# Patient Record
Sex: MALE | Race: WHITE | NOT HISPANIC OR LATINO | Employment: UNEMPLOYED | URBAN - NONMETROPOLITAN AREA
[De-identification: names, ages, dates, MRNs, and addresses within clinical notes are randomized per-mention and may not be internally consistent; named-entity substitution may affect disease eponyms.]

---

## 2017-01-12 ENCOUNTER — OFFICE VISIT - GICH (OUTPATIENT)
Dept: FAMILY MEDICINE | Facility: OTHER | Age: 47
End: 2017-01-12

## 2017-01-12 ENCOUNTER — HISTORY (OUTPATIENT)
Dept: FAMILY MEDICINE | Facility: OTHER | Age: 47
End: 2017-01-12

## 2017-01-12 ENCOUNTER — HOSPITAL ENCOUNTER (OUTPATIENT)
Dept: RADIOLOGY | Facility: OTHER | Age: 47
End: 2017-01-12
Attending: FAMILY MEDICINE

## 2017-01-12 DIAGNOSIS — R05.9 COUGH: ICD-10-CM

## 2017-01-19 ENCOUNTER — COMMUNICATION - GICH (OUTPATIENT)
Dept: FAMILY MEDICINE | Facility: OTHER | Age: 47
End: 2017-01-19

## 2017-01-24 ENCOUNTER — COMMUNICATION - GICH (OUTPATIENT)
Dept: FAMILY MEDICINE | Facility: OTHER | Age: 47
End: 2017-01-24

## 2017-01-24 DIAGNOSIS — R91.1 SOLITARY PULMONARY NODULE: ICD-10-CM

## 2017-03-19 ENCOUNTER — OFFICE VISIT - GICH (OUTPATIENT)
Dept: FAMILY MEDICINE | Facility: OTHER | Age: 47
End: 2017-03-19

## 2017-03-19 ENCOUNTER — HISTORY (OUTPATIENT)
Dept: FAMILY MEDICINE | Facility: OTHER | Age: 47
End: 2017-03-19

## 2017-03-19 DIAGNOSIS — R09.81 NASAL CONGESTION: ICD-10-CM

## 2018-01-02 NOTE — NURSING NOTE
Patient Information     Patient Name MRN Anthony Raymundo [id number] Male 1970      Nursing Note by Tricia Mccurdy at 2017  9:00 AM     Author:  Tricia Mccurdy Service:  (none) Author Type:  (none)     Filed:  2017  9:25 AM Encounter Date:  2017 Status:  Signed     :  Tricia Mccurdy            Patient here for cough and chest tightness for the past 2 months. Tricia Mccurdy LPN .......................2017  9:03 AM

## 2018-01-03 NOTE — NURSING NOTE
Patient Information     Patient Name MRAnthony Frost [id number] Male 1970      Nursing Note by Pam Mckeon at 3/19/2017  1:00 PM     Author:  Pam Mckeon Service:  (none) Author Type:  (none)     Filed:  3/19/2017  1:24 PM Encounter Date:  3/19/2017 Status:  Signed     :  Pam Mckeon            Patient presents to the clinic today for a possible sinus infection.    Pam Mckeon ....................  3/19/2017   1:09 PM

## 2018-01-03 NOTE — PROGRESS NOTES
Patient Information     Patient Name MRN Sex Anthony Hensley [id number] Male 1970      Progress Notes by Kandice Noguera PA-C at 3/19/2017  1:00 PM     Author:  Kandice Noguera PA-C Service:  (none) Author Type:  PHYS- Physician Assistant     Filed:  3/19/2017  5:51 PM Encounter Date:  3/19/2017 Status:  Signed     :  Kandice Noguera PA-C (PHYS- Physician Assistant)            SUBJECTIVE:    Anthony Gaines is a 46 y.o. male who presents for sinus concerns.    HPI Comments: Anthony is here with complaints of sinus drainage, brownish yellow with postnasal drip and congestion since December.  He was seen in January and treated for prolonged respiratory infection with amoxicillin.  He states he started to feel better on this medication but then his symptoms worsened again a few days after.  He has decreased smell sensation. No headaches, just pressure.  No history of seasonal or environmental allergy.      Patient Active Problem List     Diagnosis  Code     ANXIETY F41.1     OBSESSIVE-COMPULSIVE DISORDER, MILD F42.9     LEUKEMIA, LYMPHOCYTIC, ACUTE C91.00     Pulmonary nodule R91.1   - The leukemia history is remote, as a toddler.     No current outpatient prescriptions on file prior to visit.     No current facility-administered medications on file prior to visit.      No Known Allergies    Social history:  He is not a smoker.    REVIEW OF SYSTEMS:  Review of Systems   Constitutional: Negative for fever and malaise/fatigue.   HENT: Positive for congestion and sore throat. Negative for ear pain.    Eyes: Negative for photophobia, pain and discharge.   Respiratory: Positive for cough and sputum production. Negative for hemoptysis and shortness of breath.         His cough he feels is from drainage down the back of his throat.  Foul, thick, yellow-brown.   Cardiovascular: Negative for chest pain and palpitations.   Gastrointestinal: Negative for abdominal pain, nausea and vomiting.   Musculoskeletal: Negative  "for myalgias and neck pain.   Skin: Negative for rash.       OBJECTIVE:  /90  Temp 97.9  F (36.6  C) (Tympanic)  Ht 1.88 m (6' 2\")  Wt 117.5 kg (259 lb)  BMI 33.25 kg/m2    EXAM:   Physical Exam   Constitutional: He is well-developed, well-nourished, and in no distress.   HENT:   Head: Normocephalic.   Right Ear: External ear normal.   Left Ear: External ear normal.   Mouth/Throat: Oropharynx is clear and moist. No oropharyngeal exudate.   Nose very congested with boggy mucosa.  Frontal and maxillary sinuses nontender. Significant thick colored drainage on the posterior oropharynx.      Eyes: Conjunctivae and EOM are normal. Pupils are equal, round, and reactive to light. Right eye exhibits no discharge. Left eye exhibits no discharge.   Neck: Normal range of motion. Neck supple.   Cardiovascular: Normal rate, regular rhythm and normal heart sounds.    No murmur heard.  Pulmonary/Chest: Effort normal and breath sounds normal. No respiratory distress.   Abdominal: Bowel sounds are normal.   Lymphadenopathy:     He has no cervical adenopathy.   Skin: Skin is warm and dry. No rash noted.       ASSESSMENT/PLAN:    ICD-10-CM    1. Sinus congestion R09.81 loratadine (CLARITIN) 10 mg tablet      amoxicillin-clavulanate 875-125 mg tablet (AUGMENTIN)        Plan:   With his history of very prolonged symptoms, allergic rhinitis/ congestion may be the culprit. He will trial loratadine for the next 1-2 weeks.  He will increase oral fluids, add Mucinex BID, and consider some steam treatments or humidifier.   If not improving, will treat with Augmentin, as it may be that his previous sinus infection was only partially treated with amoxicillin, considering his history of improvement and recurrence right after the course of medication.  He was given a written prescription for the Augmentin to hold.     Kandice Noguera PA-C ....................  3/19/2017   5:51 PM          "

## 2018-01-03 NOTE — PROGRESS NOTES
Patient Information     Patient Name MRN Sex Anthony Hensley [id number] Male 1970      Progress Notes by Frantz Mauricio MD at 2017  9:00 AM     Author:  Frantz Mauricio MD Service:  (none) Author Type:  Physician     Filed:  2017  2:55 PM Encounter Date:  2017 Status:  Signed     :  Frantz Mauricio MD (Physician)            SUBJECTIVE:    Anthony Gaines is a 46 y.o. male who presents for cold    HPI Comments: Patient is here for cold. He reports a cough. chest feeling tight. This went on and off but seems to be getting worse. He's been using Vicks and DayQuil without any improvement. Mucous is dry. He is having some pressure involving the sinuses.      No Known Allergies,   Family History      Problem  Relation Age of Onset     Good Health Mother      Good Health Father    ,   No current outpatient prescriptions on file prior to visit.     No current facility-administered medications on file prior to visit.    ,   Social History        Substance Use Topics          Smoking status:   Never Smoker      Smokeless tobacco:   Current User      Types:  Chew      Alcohol use   Yes      Comment: 1      and   Social History        Substance Use Topics          Smoking status:   Never Smoker      Smokeless tobacco:   Current User      Types:  Chew      Alcohol use   Yes      Comment: 1         REVIEW OF SYSTEMS:  ROS    OBJECTIVE:  /80  Pulse 76  Temp 97.8  F (36.6  C) (Temporal)  Wt 115.9 kg (255 lb 9.6 oz)  BMI 45.28 kg/m2    EXAM:   Physical Exam   Constitutional: He is well-developed, well-nourished, and in no distress.   HENT:   Head: Normocephalic and atraumatic.   Right Ear: External ear normal.   Left Ear: External ear normal.   Cardiovascular: Normal rate and regular rhythm.    Pulmonary/Chest: Effort normal. He has wheezes. He has no rales. He exhibits no tenderness.       ASSESSMENT/PLAN:    ICD-10-CM    1. Cough R05 XR CHEST 2 VIEWS PA AND LATERAL      amoxicillin  (AMOXIL) 875 mg tablet        Plan:  Because of the length of time will start amoxicillin. Follow-up if no improvement and consider chest x-ray.

## 2018-01-03 NOTE — TELEPHONE ENCOUNTER
Patient Information     Patient Name MRN Anthony Raymundo [id number] Male 1970      Telephone Encounter by Frantz Mauricio MD at 2017 12:23 PM     Author:  Frantz Mauricio MD Service:  (none) Author Type:  Physician     Filed:  2017 12:23 PM Encounter Date:  2017 Status:  Signed     :  Frantz Mauricio MD (Physician)            done

## 2018-01-03 NOTE — TELEPHONE ENCOUNTER
Patient Information     Patient Name MRN Anthony Raymundo [id number] Male 1970      Telephone Encounter by Chanell Gurrola at 2017 12:37 PM     Author:  Chanell Gurrola Service:  (none) Author Type:  (none)     Filed:  2017 12:37 PM Encounter Date:  2017 Status:  Signed     :  Chanell Gurrola            Patient was instructed to make an X-Ray only appointment.  Chanell Gurrola LPN....................2017 12:37 PM

## 2018-01-03 NOTE — TELEPHONE ENCOUNTER
Patient Information     Patient Name MRN Sex Anthony Hensley [id number] Male 1970      Telephone Encounter by Chanell Gurrola at 2017  1:46 PM     Author:  Chanell Gurrola Service:  (none) Author Type:  (none)     Filed:  2017  1:48 PM Encounter Date:  2017 Status:  Signed     :  Chanell Gurrola            Patient is calling because he's supposed to have another CXR to check to see if the nodule in his left lung is still there.  (a letter was sent to patient)    Can you place the order for x-ray?    Chanell Gurrola LPN....................2017 1:48 PM

## 2018-01-03 NOTE — PATIENT INSTRUCTIONS
Patient Information     Patient Name MRN Anthony Raymundo [id number] Male 1970      Patient Instructions by Kandice Noguera PA-C at 3/19/2017  1:00 PM     Author:  Kandice Noguera PA-C Service:  (none) Author Type:  PHYS- Physician Assistant     Filed:  3/19/2017  1:44 PM Encounter Date:  3/19/2017 Status:  Signed     :  Kandice Noguera PA-C (PHYS- Physician Assistant)               Index [place] Related topics   Sinusitis   ________________________________________________________________________  KEY POINTS    Sinusitis is swelling and irritation of the linings of the sinuses, the hollow spaces in the bones of your face and front of your skull.    You may need to take medicine for your stuffy nose, pain, infection, or swelling.    Use saline nasal sprays or rinses to help wash out nasal passages if you have a sinus infection. A humidifier can add moisture to the air also.  ________________________________________________________________________  What is sinusitis?  Sinusitis is swelling and irritation of the linings of the sinuses. The sinuses are hollow spaces in the bones of your face and front of your skull. They connect with the nose through small openings. Like the nose, they are lined with tissue (membranes) that make mucus. Mucus drains through the small openings to the nose.  What is the cause?  The passageways from the sinuses to the nose are very narrow. When drainage of mucus from the sinuses is blocked, the sinuses get swollen and irritated. They may also become infected with bacteria, a virus, or even fungus. Allergies or irritation from pollen, mold, dust, or smoke can also cause swelling of the sinuses. Sometimes a tooth infection spreads to the sinuses.  You may be more likely to get sinus congestion and infections if you have:    Severe or untreated seasonal or year-round allergies    Injured the bones in your nose    A deformity of the nose that causes the sinuses not to drain  properly    Small growths called polyps in the sinuses that partially block the sinus openings  What are the symptoms?  Symptoms may include:    Feeling of fullness or pressure in your face or head    A headache that is most painful when you first wake up in the morning or when you bend over and put your head down    Pain in your face    Aching in the upper jaw and teeth    Runny or stuffy nose    Cough, especially at night    Fluid draining down the back of your throat (postnasal drip)    Sore throat, especially in the morning or evening  How is it diagnosed?  Your healthcare provider will ask about your symptoms and medical history and examine you. Tests are often not needed but may include:    X-ray of your sinuses    CT scan, which uses X-rays and a computer to show detailed pictures of the sinuses  How is it treated?  Several kinds of medicine may help:    Nonprescription pain medicine, such as acetaminophen, ibuprofen, or naproxen. Read the label and take as directed. Unless recommended by your healthcare provider, you should not take these medicines for more than 10 days.    Nonsteroidal anti-inflammatory medicines (NSAIDs), such as ibuprofen, naproxen, and aspirin, may cause stomach bleeding and other problems. These risks increase with age.    Acetaminophen may cause liver damage or other problems. Unless recommended by your provider, don't take more than 3000 milligrams (mg) in 24 hours. To make sure you don t take too much, check other medicines you take to see if they also contain acetaminophen. Ask your provider if you need to avoid drinking alcohol while taking this medicine.    Decongestants pills or nasal sprays to reduce swelling in your nose and sinuses and lessen the amount of mucus. Use decongestants as directed. If you are using a nonprescription nasal-spray decongestant, generally you should not use it for more than 3 days. After 3 days it may make your symptoms worse. Ask your healthcare  provider if it is OK for you to use a nasal spray decongestant longer than this.    Antihistamine tablets or a nasal spray to treat the allergies during your allergy season or, in some cases, year-round. Antihistamines block the effect of a chemical your body makes when you have an allergic reaction.    Antibiotics, if your provider thinks you might have a sinus infection    Steroid nasal spray if your provider thinks it may help clear your sinuses  If sinusitis is still a problem despite treatment, you may be referred to an allergy specialist or an ear, nose, and throat (ENT) specialist. The allergy specialist will check for and help treat your allergies to lessen the chance of having sinusitis. The ENT specialist will check for polyps or a deformed bone that may be blocking your sinuses. You may need surgery to create an extra or enlarged passageway to help your sinuses drain more easily.  Depending on what caused the sinusitis and how severe it is, it may last for days or weeks. For most cases of sinusitis, the symptoms get better gradually over 3 to 10 days.  How can I take care of myself?  Follow the full course of treatment prescribed by your healthcare provider. In addition:    If you are taking an antibiotic, take all of it as directed by your provider. If you stop taking the medicine when your symptoms are gone but before you have taken all of the medicine, symptoms may come back.    Don t smoke, and stay away from others who are smoking.    If you have allergies, try to avoid the things you are allergic to, like animal dander. Use medicine to keep your nose and sinuses open.    Use a humidifier to put more moisture in the air. This can help to open blocked sinuses and relieve pain. Avoid steam vaporizers because they can cause burns. Be sure to keep the humidifier clean, as recommended in the 's instructions. It's important to keep bacteria and mold from growing in the water container.    Use  saline nasal sprays or rinses to help wash out nasal passages if you have a sinus infection. You may use the sprays also to prevent infections.    Get plenty of rest.    Drink more fluids to keep the mucus as thin as possible so your sinuses can drain more easily.    Raise the head of your bed slightly or sleep on extra pillows to help your sinuses drain.    Put warm, moist cloths on painful areas.  Ask your healthcare provider:    How and when you will get your test results    How long it will take to recover    If there are activities you should avoid and when you can return to your normal activities    How to take care of yourself at home    What symptoms or problems you should watch for and what to do if you have them  Make sure you know when you should come back for a checkup. Keep all appointments for provider visits or tests.  How can I help prevent sinusitis?    Treat your colds and allergies promptly. Use decongestants as soon as you start having symptoms, and before you fly, travel to high altitudes, or swim in deep water.    If you smoke, try to quit. Talk to your healthcare provider about ways to quit smoking.  Developed by [facility].  Adult Advisor 2016.3 published by [facility].  Last modified: 2016-03-23  Last reviewed: 2015-08-27  This content is reviewed periodically and is subject to change as new health information becomes available. The information is intended to inform and educate and is not a replacement for medical evaluation, advice, diagnosis or treatment by a healthcare professional.  References   Adult Advisor 2016.3 Index    Copyright   2016 [facility], a division of [employer] All rights reserved.

## 2018-01-23 ENCOUNTER — DOCUMENTATION ONLY (OUTPATIENT)
Dept: FAMILY MEDICINE | Facility: OTHER | Age: 48
End: 2018-01-23

## 2018-01-23 PROBLEM — R91.1 PULMONARY NODULE: Status: ACTIVE | Noted: 2017-01-25

## 2018-01-23 PROBLEM — F41.1 ANXIETY STATE: Status: ACTIVE | Noted: 2018-01-23

## 2018-01-23 PROBLEM — F42.9 OBSESSIVE-COMPULSIVE DISORDER: Status: ACTIVE | Noted: 2018-01-23

## 2018-01-23 PROBLEM — C91.00: Status: ACTIVE | Noted: 2018-01-23

## 2018-01-25 VITALS
TEMPERATURE: 97.8 F | SYSTOLIC BLOOD PRESSURE: 122 MMHG | WEIGHT: 255.6 LBS | DIASTOLIC BLOOD PRESSURE: 80 MMHG | HEART RATE: 76 BPM

## 2018-01-25 VITALS
HEIGHT: 74 IN | WEIGHT: 259 LBS | BODY MASS INDEX: 33.24 KG/M2 | TEMPERATURE: 97.9 F | DIASTOLIC BLOOD PRESSURE: 90 MMHG | SYSTOLIC BLOOD PRESSURE: 130 MMHG

## 2018-07-23 NOTE — PROGRESS NOTES
Patient Information     Patient Name  Anthony Gaines MRN  [id number] Sex  Male   1970      Letter by Frantz Mauricio MD at      Author:  Frantz Mauricio MD Service:  (none) Author Type:  (none)    Filed:   Encounter Date:  2017 Status:  (Other)           Anthony Gaines  [address] [id number]          2017    Dear Mr. Gaines:    I have attempted to get in contact with you and have been unable to. I did get the report of your laboratory testing showing a questionable small nodule in her left lung. They did recommend a short-term follow-up chest x-ray which can be done in a week or 2. If the nodule persists they did recommend a CT scan. Please make an appointment at your convenience. Please call if you should have any questions.  Frantz Mauricio MD ....................  2017   7:53 AM

## 2019-06-24 ENCOUNTER — TELEPHONE (OUTPATIENT)
Dept: FAMILY MEDICINE | Facility: OTHER | Age: 49
End: 2019-06-24

## 2019-06-24 NOTE — TELEPHONE ENCOUNTER
Pt calling regarding immunization records.  Pt records not available.  Please call pt to discuss titer options.

## 2019-06-24 NOTE — TELEPHONE ENCOUNTER
Patient states will be going to school and is not sure what he will need or what is recommended. He states he moved here from [facility] about 16 years ago.  Patient notified we can request his paper chart, he may need to stop to sign PABLITO to get records. He said when he moved they should have been sent.   Maria E Wynn LPN .............6/24/2019     9:16 AM    Paper chart requested.

## 2019-06-24 NOTE — TELEPHONE ENCOUNTER
Patient notified we do not have paper chart available per HIM. Patient can stop to sign a PABLITO, or call [facility] to have them send us or him immunizations. Patient states may just come to have shots, suggested he find out from the school what they require and possibly have titers checked prior to shots. Patient states understanding.  Maria E Wynn LPN .............6/24/2019     10:35 AM

## 2019-06-27 ENCOUNTER — ALLIED HEALTH/NURSE VISIT (OUTPATIENT)
Dept: FAMILY MEDICINE | Facility: OTHER | Age: 49
End: 2019-06-27
Payer: COMMERCIAL

## 2019-06-27 DIAGNOSIS — Z23 NEED FOR VACCINATION: Primary | ICD-10-CM

## 2019-06-27 PROCEDURE — 90715 TDAP VACCINE 7 YRS/> IM: CPT

## 2019-06-27 PROCEDURE — 90471 IMMUNIZATION ADMIN: CPT

## 2019-06-27 NOTE — PROGRESS NOTES
Pt denies allergies to yeast gelatin neosporin eggs thimerasol or latex or past reactions to vaccinations. Verified name and date of birth  Copy of MIIC given. Pt instructed to wait 15 min post injection in lobby and to report any reactions to nursing.  Shanika Corado RN on 6/27/2019 at 10:54 AM